# Patient Record
Sex: FEMALE | Race: BLACK OR AFRICAN AMERICAN | Employment: OTHER | URBAN - METROPOLITAN AREA
[De-identification: names, ages, dates, MRNs, and addresses within clinical notes are randomized per-mention and may not be internally consistent; named-entity substitution may affect disease eponyms.]

---

## 2022-09-27 ENCOUNTER — HOSPITAL ENCOUNTER (EMERGENCY)
Age: 57
Discharge: HOME | End: 2022-09-27
Attending: EMERGENCY MEDICINE
Payer: MEDICARE

## 2022-09-27 VITALS
OXYGEN SATURATION: 100 % | DIASTOLIC BLOOD PRESSURE: 84 MMHG | RESPIRATION RATE: 16 BRPM | SYSTOLIC BLOOD PRESSURE: 125 MMHG | TEMPERATURE: 98 F | HEART RATE: 57 BPM

## 2022-09-27 DIAGNOSIS — R35.0 URINARY FREQUENCY: Primary | ICD-10-CM

## 2022-09-27 LAB
BILIRUBIN URINE: NEGATIVE
BLOOD, URINE: NEGATIVE
CLARITY: CLEAR
COLOR: YELLOW
GLUCOSE BLD-MCNC: 80 MG/DL (ref 70–99)
GLUCOSE URINE: NEGATIVE MG/DL
KETONES, URINE: NEGATIVE MG/DL
LEUKOCYTE ESTERASE, URINE: NEGATIVE
MICROSCOPIC EXAMINATION: NORMAL
NITRITE, URINE: NEGATIVE
PERFORMED ON: NORMAL
PH UA: 6 (ref 5–8)
PROTEIN UA: NEGATIVE MG/DL
SPECIFIC GRAVITY UA: 1.02 (ref 1–1.03)
URINE REFLEX TO CULTURE: NORMAL
URINE TYPE: NORMAL
UROBILINOGEN, URINE: 1 E.U./DL

## 2022-09-27 PROCEDURE — 81003 URINALYSIS AUTO W/O SCOPE: CPT

## 2022-09-27 PROCEDURE — 99283 EMERGENCY DEPT VISIT LOW MDM: CPT

## 2022-09-27 RX ORDER — PHENAZOPYRIDINE HYDROCHLORIDE 200 MG/1
200 TABLET, FILM COATED ORAL 3 TIMES DAILY PRN
Qty: 6 TABLET | Refills: 0 | Status: SHIPPED | OUTPATIENT
Start: 2022-09-27 | End: 2022-09-30

## 2022-09-27 NOTE — DISCHARGE INSTRUCTIONS
Trial pyridium for possible bladder spasm. It will turn your urine orange.  Follow up with [facility] if symptoms persist.

## 2022-09-27 NOTE — ED PROVIDER NOTES
[address] [facility]          ATTENDING PHYSICIAN NOTE       Date of evaluation: 9/27/2022    Chief Complaint     Urinary Tract Infection (Put on macrobid two weeks ago, switched to cipro 5 days ago d/t symptoms of macrobid. States urinary symptoms now worse.)      History of Present Illness     Mary Ellen Rodas is a 62 y.o. female with a history of lupus who presents with complaints of a possible ongoing urinary tract infection. The patient was seen by her primary care physician on the 14th of this month and placed on Macrobid for urinary frequency. Urine cultures obtained at that time which ultimately grew out E. coli sensitive to Macrobid, Cipro, and Rocephin. She states that her symptoms did not get better after 4 days on the Macrobid and her doctor changed her to Cipro. The patient feels as if she is still getting worse while on Cipro developing suprapubic pressure and pressure with urination as well as some left flank pain and some slight right flank pain. She denies true dysuria and has not noted hematuria. He has no other systemic symptoms such as nausea, vomiting, or fever. Review of Systems     Constitutional:  No fever or chills  GI:  No nausea, vomiting, diarrhea, or constipation, slight suprapubic pressure  :  Slight L>R flank pain  All other systems are negative. Past Medical, Surgical, Family, and Social History     She has a past medical history of Arthritis, Back pain, chronic, and Lupus ([address] She has a past surgical history that includes Adrenal gland surgery (4/2003). Her family history is not on file. She reports that she has never smoked. She does not have any smokeless tobacco history on file. She reports that she does not drink alcohol and does not use drugs. Medications     Previous Medications    CHOLECALCIFEROL 400 UNIT TABS TABLET    Take 400 Units by mouth daily.       FERROUS SULFATE 325 (65 FE) MG TABLET    Take 325 mg by mouth 3 times daily (with meals). LEVOTHYROXINE (SYNTHROID) 50 MCG TABLET    Take 50 mcg by mouth Daily. TIZANIDINE (ZANAFLEX) 4 MG TABLET    Take 4 mg by mouth every 6 hours as needed. Allergies     She is allergic to pcn [penicillins] and minocycline. Physical Exam     INITIAL VITALS: BP: 125/84, Temp: 98 °F (36.7 °C), Heart Rate: 57, Resp: 16, SpO2: 100 %   Constitutional:  Well developed, well nourished, no acute distress, non-toxic appearance   Skin:  Well hydrated, no rash  GI:  Soft, non-distended, + bowel sounds, nontender  :  Slight L>R CVA tenderness, no lower abdominal tenderness  Back:  No midline tenderness, FROM     Diagnostic Results     LABS:   Results for orders placed or performed during the hospital encounter of 09/27/22   Urinalysis with Reflex to Culture    Specimen: Urine, clean catch   Result Value Ref Range    Color, UA Yellow Straw/Yellow    Clarity, UA Clear Clear    Glucose, Ur Negative Negative mg/dL    Bilirubin Urine Negative Negative    Ketones, Urine Negative Negative mg/dL    Specific Gravity, UA 1.025 1.005 - 1.030    Blood, Urine Negative Negative    pH, UA 6.0 5.0 - 8.0    Protein, UA Negative Negative mg/dL    Urobilinogen, Urine 1.0 <2.0 E.U./dL    Nitrite, Urine Negative Negative    Leukocyte Esterase, Urine Negative Negative    Microscopic Examination Not Indicated     Urine Type NotGiven     Urine Reflex to Culture Not Indicated    POCT Glucose   Result Value Ref Range    POC Glucose 80 70 - 99 mg/dl    Performed on ACCU-CHEK        RECENT VITALS:  BP: 125/84, Temp: 98 °F (36.7 °C), Heart Rate: 57, Resp: 16, SpO2: 100 %     ED Course     Nursing Notes, Past Medical Hx, Past Surgical Hx, Social Hx, Allergies, and Family Hx were reviewed.     The patient was given the following medications:  Orders Placed This Encounter   Medications    phenazopyridine (PYRIDIUM) 200 MG tablet     Sig: Take 1 tablet by mouth 3 times daily as needed for Pain (bladder spasm/pain) Dispense:  6 tablet     Refill:  0         MEDICAL DECISION MAKING / ASSESSMENT / PLAN     Ester Gutierres is a 62 y.o. female presenting with symptoms of suprapubic discomfort and urinary frequency which have continued despite treatment with 2 different antibiotics for urinary tract infection, Macrobid and Cipro, which based on culture results should have eradicated the E. coli as it was sensitive to both of these drugs. Urinalysis here is normal.  It appears that the patient has cleared her urinary tract infection with the 2 courses of antibiotics. She still complains of urinary frequency and her blood sugar was checked and it was 80. It does not appear that the frequency is related to new onset diabetes. There could be a component of bladder spasm and she will be started on Pyridium and given a referral to urology should symptoms not resolve. Clinical Impression     1.  Urinary frequency        Disposition     PATIENT REFERRED TO:  [facility]  [address] [address] [address]  [phone]  Call   to schedule followup appointment if frequency does not subside    [address]  [phone]    Call   to schedule followup appointment    DISCHARGE MEDICATIONS:  New Prescriptions    PHENAZOPYRIDINE (PYRIDIUM) 200 MG TABLET    Take 1 tablet by mouth 3 times daily as needed for Pain (bladder spasm/pain)       DISPOSITION DISPOSITION Decision To Discharge 09/27/2022 05:19:28 PM        Ray Strong MD  09/27/22 3008

## 2024-09-06 ENCOUNTER — HOSPITAL ENCOUNTER (EMERGENCY)
Age: 59
Discharge: HOME | End: 2024-09-06
Attending: STUDENT IN AN ORGANIZED HEALTH CARE EDUCATION/TRAINING PROGRAM
Payer: MEDICARE

## 2024-09-06 VITALS
WEIGHT: 128.6 LBS | SYSTOLIC BLOOD PRESSURE: 140 MMHG | HEIGHT: 65 IN | OXYGEN SATURATION: 100 % | HEART RATE: 60 BPM | RESPIRATION RATE: 18 BRPM | DIASTOLIC BLOOD PRESSURE: 97 MMHG | TEMPERATURE: 97.8 F | BODY MASS INDEX: 21.43 KG/M2

## 2024-09-06 DIAGNOSIS — F43.9 STRESS: Primary | ICD-10-CM

## 2024-09-06 DIAGNOSIS — Z76.89 ENCOUNTER FOR PSYCHIATRIC ASSESSMENT: ICD-10-CM

## 2024-09-06 LAB — TSH SERPL DL<=0.005 MIU/L-ACNC: 1.94 UIU/ML (ref 0.27–4.2)

## 2024-09-06 PROCEDURE — 90792 PSYCH DIAG EVAL W/MED SRVCS: CPT | Performed by: NURSE PRACTITIONER

## 2024-09-06 PROCEDURE — 84443 ASSAY THYROID STIM HORMONE: CPT

## 2024-09-06 PROCEDURE — 99285 EMERGENCY DEPT VISIT HI MDM: CPT

## 2024-09-06 ASSESSMENT — PAIN - FUNCTIONAL ASSESSMENT: PAIN_FUNCTIONAL_ASSESSMENT: NONE - DENIES PAIN

## 2024-09-06 NOTE — CARE COORDINATION
Case Management Assessment            Discharge Note                    Date / Time of Note: 9/6/2024 2:35 PM                  Discharge Note Completed by: MIRTA Barber    Patient Name: Denise Kaur   YOB: 1965  Diagnosis: No admission diagnoses are documented for this encounter.   Date / Time: 9/6/2024 10:44 AM    Current PCP: Veronique Alexander MD  Clinic patient: No    Hospitalization in the last 30 days: No       Advance Directives:  Code Status: Prior  [place] DNR form completed and on chart: Not Indicated    Financial:  Payor: [facility] MEDICARE / Plan: [facility] MEDICARE COMPLETE / Product Type: *No Product type* /      Pharm[facility]    [facility] [id number] [address] -  [phone] -  [phone]  [address] [facility] [address]  Phone: [phone] Fax: [phone]      Assistance purchasing medications?:    Assistance provided by Case Management: None at this time    Does patient want to participate in local refill/ meds to beds program?:      Meds To Beds General Rules:  1. Can ONLY be done Monday- Friday between 8:30am-5pm  2. Prescription(s) must be in pharmacy by 3pm to be filled same day  3.Copy of patient's insurance/ prescription drug card and patient face sheet must be sent along with the prescription(s)  4. Cost of Rx cannot be added to hospital bill. If financial assistance is needed, please contact unit  or ;  or  CANNOT provide pharmacy voucher for patients co-pays  5. Patients can then  the prescription on their way out of the hospital at discharge, or pharmacy can deliver to the bedside if staff is available. (payment due at time of pick-up or delivery - cash, check, or card accepted)     Able to afford home medications/ co-pay costs: Yes    ADLS:  Current PT AM-PAC Score:   /24  Current OT AM-PAC Score:   /24    DISCHARGE Disposition: Home- No Services Needed    LOC at  discharge: Not Applicable  AB Completed: Not Indicated    Notification completed in HENS/PAS?:  Not Applicable    IMM Completed:   Not Indicated due to: Observation          Transportation:  Transportation PLAN for discharge: family   Mode of Transport: Private Car    Referrals made at DISCHARGE for outpatient continued care:  Not Applicable    Additional CM Notes: Patient to discharge home today with no needs. Psychiatry has met with patient and cleared her for discharge along with MD. SW met with patient at bedside to assess discharge needs and to discuss safety concerns presented to the team. Patient noted that her daughter that she was having safety concerns about will not be at the home and that she is out of town. Patient has a safety plan in place if her daughter comes back to the home and becomes agitated and patient plans to call 911 and has the means to lock herself in her bedroom while waiting on emergency response. SW met with patient at bedside to discuss discharge plan. Patient has no more questions at this time and is content with the discharge plan.      COVID Result:  No results found for: \"COVID19\"    The Plan for Transition of Care is related to the following treatment goals of No admission diagnoses are documented for this encounter.    The Patient and/or patient representative Denise and her family were provided with a choice of provider and agrees with the discharge plan Yes    Freedom of choice list was provided with basic dialogue that supports the patient's individualized plan of care/goals and shares the quality data associated with the providers. Yes    Care Transitions patient: No    MIRTA Barber  [facility]  Case Management Department  Ph: [phone]  Fax: [phone]

## 2024-09-06 NOTE — ED TRIAGE NOTES
Patient arrived in the ER with c/o suicidal ideation. Patient states that the her daughter is stressing her out. Patient states that her daughter is not making the best decision and it's weighing on her. Patient spoke to a therapist yesterday and they told her to come to emergency room to seek help. Patient states that she feel down and is thinking of killing herself. However,the reason she wont harm herself is that her daughter needs her.

## 2024-09-06 NOTE — ED NOTES
Blood sample obtained by straight stick right AC. Blood sent to lab. Pt tolerated procedure well.     Godfrey Nielson, PURA  09/06/24 9945

## 2024-09-06 NOTE — VIRTUAL HEALTH
Denise Kaur, was evaluated through   synchronous (real-time) audio-video encounter. The patient (and/or guardian if applicable) is aware that this is a billable service, which includes applicable co-pays. This virtual visit was conducted with patient's (and/or legal guardian's) consent. Patient identification was verified, and a caregiver was present when appropriate.  The patient was located at Facility (Appt Department): [facility] EMERGENCY DEPARTMENT  [address] [facility] [address]  Loc: [phone]  The provider was located at Home (City/State[place]   Confirm you are appropriately licensed, registered, or certified to deliver care in the state where the patient is located as indicated above. If you are not or unsure, please re-schedule the visit: Yes, I confirm.   "[employer] Consult to Tele-Psych  Consult performed by: Delma Alvarez, SACHA - CNP  Consult ordered by: Christelle Conner MD Kimberly D Smith  [id number]  1965     EMERGENCY DEPARTMENT TELEPSYCHIATRY EVALUATION    09/06/24    Chief Complaint:  “My daughter”  HPI: Patient is a 59 y.o.  female who presents for psychiatric evaluation. Patient presented to the ED on 09/06/24 from home. The patient states she is here for a wellness check because she had been feeling depressed and stressed out over things that have been going on at home with her daughter. She states her daughter has been taking her through a lot. The patient patient talked to her therapist this morning and told her she was feeling suicidal yesterday and really down. She was advised to come to the ED for further evaluation. The patient currently denies any SI, plan or intent. She admits to having sucidial thoughts in the past. She has had  previous suicide attempts in the past with the most recent being 8-10 years ago. The patients daughter left home, she is currently somewhere in [place]. Pt states she is  care and support after discharge  Legal Status: VOLUNTARY.  OK to d/c suicide and elopement precautions.  Medical co-morbidities: Management per medical providers, appreciate assistance.  Medication Recommendations:  Continue home medications as prescribed  Reviewed treatment plan with patient including risks, benefits, alternatives, and side effects of medications, and any/all black box warnings. Patient verbalized understanding.  Patient had an opportunity to ask questions and address concerns. Obtained informed consent for treatment.  Medical records, labs, and diagnostic tests reviewed.   Re-consult for any new changes or concerns. Thank you for this consult.  Discussed recommendations with Dr. Conner at time of consult completion.    TelePsych recommendations:Discharge    Legal hold: No Involuntary Hold    Telepsychiatry will sign off. Thank you for allowing us to participate in the care of this patient. Please send message or call via [employer] if anything more is required.     Electronically signed by SACHA James CNP on 9/6/2024 at 12:09 PM.    END OF NOTE  -------------------------       Total time spent on this encounter: Not billed by time    --SACHA James CNP on 9/6/2024 at 12:07 PM    An electronic signature was used to authenticate this note.
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
## 2024-11-18 ENCOUNTER — HOSPITAL ENCOUNTER (EMERGENCY)
Age: 59
Discharge: HOME | End: 2024-11-18
Attending: STUDENT IN AN ORGANIZED HEALTH CARE EDUCATION/TRAINING PROGRAM
Payer: MEDICARE

## 2024-11-18 VITALS
WEIGHT: 125.66 LBS | HEART RATE: 55 BPM | SYSTOLIC BLOOD PRESSURE: 149 MMHG | DIASTOLIC BLOOD PRESSURE: 102 MMHG | OXYGEN SATURATION: 100 % | HEIGHT: 65 IN | BODY MASS INDEX: 20.94 KG/M2 | RESPIRATION RATE: 18 BRPM | TEMPERATURE: 98.3 F

## 2024-11-18 DIAGNOSIS — R10.13 ABDOMINAL PAIN, EPIGASTRIC: Primary | ICD-10-CM

## 2024-11-18 LAB
ALBUMIN SERPL-MCNC: 4.5 G/DL (ref 3.4–5)
ALBUMIN/GLOB SERPL: 1.4 {RATIO} (ref 1.1–2.2)
ALP SERPL-CCNC: 63 U/L (ref 40–129)
ALT SERPL-CCNC: 11 U/L (ref 10–40)
ANION GAP SERPL CALCULATED.3IONS-SCNC: 12 MMOL/L (ref 3–16)
AST SERPL-CCNC: 20 U/L (ref 15–37)
BASOPHILS # BLD: 0 K/UL (ref 0–0.2)
BASOPHILS NFR BLD: 1 %
BILIRUB SERPL-MCNC: 0.5 MG/DL (ref 0–1)
BUN SERPL-MCNC: 12 MG/DL (ref 7–20)
CALCIUM SERPL-MCNC: 9.7 MG/DL (ref 8.3–10.6)
CHLORIDE SERPL-SCNC: 104 MMOL/L (ref 99–110)
CO2 SERPL-SCNC: 23 MMOL/L (ref 21–32)
CREAT SERPL-MCNC: 0.9 MG/DL (ref 0.6–1.1)
DEPRECATED RDW RBC AUTO: 14.9 % (ref 12.4–15.4)
EKG ATRIAL RATE: 52 BPM
EKG DIAGNOSIS: NORMAL
EKG P AXIS: 74 DEGREES
EKG P-R INTERVAL: 164 MS
EKG Q-T INTERVAL: 464 MS
EKG QRS DURATION: 72 MS
EKG QTC CALCULATION (BAZETT): 431 MS
EKG R AXIS: 21 DEGREES
EKG T AXIS: 60 DEGREES
EKG VENTRICULAR RATE: 52 BPM
EOSINOPHIL # BLD: 0 K/UL (ref 0–0.6)
EOSINOPHIL NFR BLD: 1.2 %
GFR SERPLBLD CREATININE-BSD FMLA CKD-EPI: 73 ML/MIN/{1.73_M2}
GLUCOSE SERPL-MCNC: 89 MG/DL (ref 70–99)
HCT VFR BLD AUTO: 43.7 % (ref 36–48)
HGB BLD-MCNC: 13.9 G/DL (ref 12–16)
LIPASE SERPL-CCNC: 30 U/L (ref 13–60)
LYMPHOCYTES # BLD: 1.1 K/UL (ref 1–5.1)
LYMPHOCYTES NFR BLD: 32.7 %
MCH RBC QN AUTO: 28.3 PG (ref 26–34)
MCHC RBC AUTO-ENTMCNC: 31.8 G/DL (ref 31–36)
MCV RBC AUTO: 89 FL (ref 80–100)
MONOCYTES # BLD: 0.3 K/UL (ref 0–1.3)
MONOCYTES NFR BLD: 8.6 %
NEUTROPHILS # BLD: 1.8 K/UL (ref 1.7–7.7)
NEUTROPHILS NFR BLD: 56.5 %
PLATELET # BLD AUTO: 265 K/UL (ref 135–450)
PMV BLD AUTO: 8.1 FL (ref 5–10.5)
POTASSIUM SERPL-SCNC: 4.2 MMOL/L (ref 3.5–5.1)
PROT SERPL-MCNC: 7.8 G/DL (ref 6.4–8.2)
RBC # BLD AUTO: 4.91 M/UL (ref 4–5.2)
SODIUM SERPL-SCNC: 139 MMOL/L (ref 136–145)
WBC # BLD AUTO: 3.3 K/UL (ref 4–11)

## 2024-11-18 PROCEDURE — 80053 COMPREHEN METABOLIC PANEL: CPT

## 2024-11-18 PROCEDURE — 6370000000 HC RX 637 (ALT 250 FOR IP)

## 2024-11-18 PROCEDURE — 83690 ASSAY OF LIPASE: CPT

## 2024-11-18 PROCEDURE — 85025 COMPLETE CBC W/AUTO DIFF WBC: CPT

## 2024-11-18 PROCEDURE — 99285 EMERGENCY DEPT VISIT HI MDM: CPT

## 2024-11-18 PROCEDURE — 93005 ELECTROCARDIOGRAM TRACING: CPT

## 2024-11-18 RX ORDER — PANTOPRAZOLE SODIUM 40 MG/1
40 TABLET, DELAYED RELEASE ORAL
Qty: 45 TABLET | Refills: 0 | Status: SHIPPED | OUTPATIENT
Start: 2024-11-18

## 2024-11-18 RX ORDER — BISMUTH SUBSALICYLATE 262 MG/1
524 TABLET, CHEWABLE ORAL ONCE
Status: COMPLETED | OUTPATIENT
Start: 2024-11-18 | End: 2024-11-18

## 2024-11-18 RX ORDER — PANTOPRAZOLE SODIUM 40 MG/1
40 TABLET, DELAYED RELEASE ORAL
Qty: 45 TABLET | Refills: 0 | Status: SHIPPED | OUTPATIENT
Start: 2024-11-18 | End: 2024-11-18

## 2024-11-18 RX ADMIN — BISMUTH SUBSALICYLATE 524 MG: 262 TABLET, CHEWABLE ORAL at 17:36

## 2024-11-18 ASSESSMENT — PAIN DESCRIPTION - DESCRIPTORS: DESCRIPTORS: SHARP

## 2024-11-18 ASSESSMENT — PAIN - FUNCTIONAL ASSESSMENT: PAIN_FUNCTIONAL_ASSESSMENT: 0-10

## 2024-11-18 ASSESSMENT — PAIN SCALES - GENERAL: PAINLEVEL_OUTOF10: 8

## 2024-11-18 ASSESSMENT — PAIN DESCRIPTION - LOCATION: LOCATION: ABDOMEN

## 2024-11-18 NOTE — ED PROVIDER NOTES
THE [facility]  EMERGENCY DEPARTMENT ENCOUNTER          [facility] RESIDENT NOTE       Date of evaluation: 11/18/2024    Chief Complaint     Abdominal Pain and Nausea (Pt. Presents to ED with c/o and pain and nausea for past week. )      History of Present Illness     Denise Kaur is a 59 y.o. female with past medical history of H. pylori s/p treatment, SLE, back pain who presents with epigastric abdominal pain for 1 week's duration.  When symptoms started she started taking old famotidine and PPI medication was that she had in the house.  Endorses that they did not help.  Symptoms are constant and not related to time a day or eating foods.  Denies emesis, hematemesis, diarrhea, hematochezia, fevers, chills.    ASSESSMENT / PLAN  (MEDICAL DECISION MAKING)     INITIAL VITALS: BP: (!) 149/102, Temp: 98.3 °F (36.8 °C), Pulse: 55, Respirations: 18, SpO2: 100 %     Denise Kaur is a 59 y.o. female with history of H. pylori in the past that has been treated who presents with epigastric pain and nausea as per HPI above.  ***    Is this patient to be included in the SEP-1 core measure? {Sep-1 Core Yes/No:242875}    Medical Decision Making  Amount and/or Complexity of Data Reviewed  Labs: ordered.  ECG/medicine tests: ordered.    Risk  OTC drugs.  Prescription drug management.        This patient was also evaluated by the attending physician. All care plans were discussed and agreed upon.    Clinical Impression     No diagnosis found.    Disposition     PATIENT REFERRED TO:  No follow-up provider specified.    DISCHARGE MEDICATIONS:  New Prescriptions    No medications on file       DISPOSITION               Diagnostic Results and Other Data       RADIOLOGY:  No orders to display       LABS:   Results for orders placed or performed during the hospital encounter of 11/18/24   CBC with Auto Differential   Result Value Ref Range    WBC 3.3 (L) 4.0 - 11.0 K/uL    RBC 4.91 4.00 - 5.20 M/uL    Hemoglobin 13.9 12.0 - 16.0  following medications:  No orders of the defined types were placed in this encounter.      CONSULTS:  None    Review of Systems    Past Medical, Surgical, Family, and Social History     She has a past medical history of Arthritis, Back pain, chronic, and Lupus.  She has a past surgical history that includes Adrenal gland surgery (4/2003).  Her family history is not on file.  She reports that she has never smoked. She does not have any smokeless tobacco history on file. She reports that she does not drink alcohol and does not use drugs.    Medications     Previous Medications    CHOLECALCIFEROL 400 UNIT TABS TABLET    Take 400 Units by mouth daily.      FERROUS SULFATE 325 (65 FE) MG TABLET    Take 325 mg by mouth 3 times daily (with meals).      LEVOTHYROXINE (SYNTHROID) 50 MCG TABLET    Take 50 mcg by mouth Daily.    TIZANIDINE (ZANAFLEX) 4 MG TABLET    Take 4 mg by mouth every 6 hours as needed.         Allergies     She is allergic to pcn [penicillins] and minocycline.    Physical Exam     INITIAL VITALS: BP: (!) 149/102, Temp: 98.3 °F (36.8 °C), Pulse: 55, Respirations: 18, SpO2: 100 %   Physical Exam

## 2024-11-18 NOTE — ED PROVIDER NOTES
ED Attending Attestation Note     Date of evaluation: 2024    This patient was seen by the resident.  I have seen and examined the patient, agree with the workup, evaluation, management and diagnosis. The care plan has been discussed.  I have reviewed the EKG and agree with the resident interpretation.  My assessment reveals a 59-year-old female with history of recurrent epigastric pain, prior completely treated H. pylori and irritable bowel syndrome who presents with recurrence of epigastric abdominal pain over the last 2 weeks.  She tells me she has had symptoms like this in the past, typically resolves with pantoprazole and famotidine, but her prescriptions had  because her GI doctor had retired and so did not have updated prescriptions to take for this issue.  On exam, she has minimal epigastric tenderness on palpation without rebound or peritoneal signs.  She is comfortable in appearance, has equal pulses in all 4 extremities and has no cardiopulmonary complaints.     EKG shows sinus bradycardia 50 bpm.  , QRS 92, QTc 431.  No acute ST elevations or depressions.  No hyperacute T waves.       Alberto Foy MD  [id number]

## 2025-01-02 ASSESSMENT — ENCOUNTER SYMPTOMS
ABDOMINAL PAIN: 1
SHORTNESS OF BREATH: 0
VOMITING: 0
NAUSEA: 0
DIARRHEA: 0
COUGH: 0
ABDOMINAL DISTENTION: 0
VOICE CHANGE: 0